# Patient Record
Sex: MALE | Race: WHITE | NOT HISPANIC OR LATINO | Employment: STUDENT | URBAN - METROPOLITAN AREA
[De-identification: names, ages, dates, MRNs, and addresses within clinical notes are randomized per-mention and may not be internally consistent; named-entity substitution may affect disease eponyms.]

---
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
## 2022-05-19 ENCOUNTER — DOCUMENTATION (OUTPATIENT)
Dept: PSYCHIATRY | Facility: CLINIC | Age: 22
End: 2022-05-19

## 2022-05-19 NOTE — PSYCH
Psychiatric Discharge Summary     Admit Date: 9/24/2018  Discharge Date: 5/19/2022    Discharge Diagnosis:  1  Intellectual Disability- Mild, 2  Unspecified Mood Disorder, 3  Batten Disease    Treating Physician: SALOMON Okeefe        Presenting Problems/Pertinent Findings:      16-7 y/o male, parents  for past 9 years (mother with primary physical custody, shared decision-making but no contact with father over past 5 years), domiciled with mother, step-father, 2 step-sisters (17 y/o, 14 y/o), 2 half-siblings (35 years old sister, 8 y/o brother) in [place], currently enrolled in 9th grade at [employer] ([employer]- emotionally support classroom, IQ about 61, 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 paraprofessional in school, 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, can read braille, has some math skills on 1st/2nd grade level, has 2 friends, no bullying/teasing), PPH significant for h/o autistic spectrum disorder, intellectual disability, anxiety, 1 past psychiatric hospitalization ([address] [place] [address]  in 8/2018 for emotional outbursts), no past suicide attempts, no h/o self-injurious behaviors, h/o physical aggression towards family (biting, pulling on somebody, throwing things), PMH significant for pigmentary retinal dystrophy, seizure disorder, generalized headaches, legal blindness, gait disturbance, no active substance abuse, presents to [facility] outpatient clinic, with mother reporting "he has a lot of outbursts, has sleeping problems "     On assessment today, patient was developing appropriately until about [de-identified] years old when he started regressing and losing some of his previously learned developmental skills, also developing a generalized seizure disorder around that time, patient subsequently regressing in terms of walking with an unsteady, shuffling gait, having increased enuretic episodes, and patient with difficulties communicating, in psychosocial context of being legally blind, significant family stressors, academic difficulties  No current passive or active suicidal ideation, intent, or plan  Currently, patient is not an imminent risk of harm to self or others and is appropriate for outpatient level of care at this time  Course of Treatment:  Patient was in treatment with this provider from 9/24/2018 through most recent office visit on 4/14/2020  At the start of treatment, Lebron Renae was on Depakote 750 mg qAM, 625 mg qhs for mood stability, Clonidine 0 1 mg qhs for impulse control, and Klonopin 0 5 mg qAM, 1 mg qhs  In 1/2019, he was switched from Clonidine to Intuniv titrated up to 3 mg daily  By the end of treatment, Lebron Renae was on Depakote 625 mg bid and Intuniv 3 mg daily  He was admitted to a long-term rehab facility for [facility] Disease  Subsequently, the patient withdrew from treatment  Criteria for Discharge: Withdrew from Treatment    Aftercare Recommendations: Follow up with pcp    Discharge Medications:   Current Outpatient Medications:     clonazePAM (KlonoPIN) 1 mg tablet, Take 1 tablet (1 mg total) by mouth 2 (two) times a day Take full tablet in morning, full tablet at night , Disp: 60 tablet, Rfl: 2    divalproex sodium (DEPAKOTE) 125 mg EC tablet, 125 mg 5 in the AM, 5 in the PM , Disp: , Rfl:     guanFACINE HCl ER 3 MG TB24, Take 1 tablet (3 mg total) by mouth every evening, Disp: 90 tablet, Rfl: 0    Misc  Devices [facility]) MISC, by Does not apply route daily, Disp: 1 each, Rfl: 0    Misc  Devices MISC, Wheelchair delivery and set up, Disp: 1 each, Rfl: 0    ramelteon (ROZEREM) 8 mg tablet, Take 8 mg by mouth daily at bedtime, Disp: , Rfl:        Mental Status at Time of most recent visit on 1/25/2019       Mental status:  Appearance Sitting in wheel-chair, appears comfortable, dressed in casual clothing, attempted to walk, had shuffling gait, only able to take a few steps   Mood Unable to assess   Affect Appears mildly constricted in depressed range, stable, mood-congruent   Speech Dysarthric, answers basic yes/no questions, difficult to comprehend   Thought Processes Linear and goal directed and concrete   Associations intact associations   Hallucinations Denies any auditory or visual hallucinations   Thought Content No passive or active suicidal or homicidal ideation, intent, or plan     Orientation Oriented to person, place, time, and situation   Recent and Remote Memory grossly intact   Attention Span concentration impaired   Intellect Appears to be of below average Intelligence   Insight Poor insight    Judgement judgment was impaired   Muscle Strength Muscle strength and tone were normal   Language Within normal limits   Fund of Knowledge Age appropriate   Pain none